# Patient Record
Sex: MALE | Race: WHITE
[De-identification: names, ages, dates, MRNs, and addresses within clinical notes are randomized per-mention and may not be internally consistent; named-entity substitution may affect disease eponyms.]

---

## 2019-03-09 ENCOUNTER — HOSPITAL ENCOUNTER (EMERGENCY)
Dept: HOSPITAL 7 - FB.ED | Age: 29
Discharge: HOME | End: 2019-03-09
Payer: COMMERCIAL

## 2019-03-09 VITALS — SYSTOLIC BLOOD PRESSURE: 182 MMHG | DIASTOLIC BLOOD PRESSURE: 95 MMHG

## 2019-03-09 DIAGNOSIS — S61.211A: Primary | ICD-10-CM
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
## 2019-03-09 DIAGNOSIS — Z23: ICD-10-CM

## 2019-03-09 PROCEDURE — 12001 RPR S/N/AX/GEN/TRNK 2.5CM/<: CPT

## 2019-03-09 PROCEDURE — 99283 EMERGENCY DEPT VISIT LOW MDM: CPT

## 2019-03-09 PROCEDURE — 96372 THER/PROPH/DIAG INJ SC/IM: CPT

## 2019-03-09 PROCEDURE — 90715 TDAP VACCINE 7 YRS/> IM: CPT

## 2019-03-09 PROCEDURE — 90471 IMMUNIZATION ADMIN: CPT

## 2019-03-09 NOTE — ER
DATE OF PROCEDURE:  03/09/2019

 

This 28-year-old male was working at [employer] when he was cutting a

belt with a sharp knife.  The knife slipped and he sustained a tangential 
laceration

to the tip of his left index finger.  The injury is on the radial side and

resulted in removal of 1 x 1.5 cm area of skin.  A small corner of the nail was

included in the injury, but the underlying bone and majority of the fatty tissue

remained uninjured.  The patient presents bringing in the piece of skin that was

cut off.  The remainder of the finger appears normal.  A digital block of 0.5%

Marcaine was administered and then cautery was used to control bleeding in the

wound.  The piece of skin brought in was debrided and then it was secured back

covering the open wound with interrupted 4-0 Prolene sutures.  This will act as

a dressing as the underlying wound heals.  Antibiotic ointment and a sterile

dressing were placed.  The procedure was well tolerated by the patient.  He was

carefully instructed to elevate the hand, to leave the dressing in place until

he comes in to see me in the outpatient clinic in two days.  Tetanus booster and

a shot of Rocephin were given as well.  If he has further difficulty over the

weekend, then he will call or come back to the emergency room for re-evaluation.

 

Job#: [id number]

DD: 03/09/2019 1242

DT: 03/09/2019 1301 VANDANA/EVANGELIST OCHOA

## 2019-03-09 NOTE — EDM.PDOC
ED HPI GENERAL MEDICAL PROBLEM





- General


Stated Complaint: CUT FINGER-WORK RELATED


Time Seen by Provider: 03/09/19 10:57


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


History Limitations: Reports: No Limitations





- History of Present Illness


INITIAL COMMENTS - FREE TEXT/NARRATIVE: 





28 y.o.w.m came with his supervisor to the ED after he cut in his left index 

finger while cutting a belt. TD (?). Pt's supervisor put the tissue, which he 

cut off, in a plastic bag on ICE. Pt has FROM of his fingers,  the CAP refill 

is less then 2 sec.  The wound is  active bleeding, moderately. No pulsatile 

bleed. No N/V/D or any other acute med. issues. /92 Pulse 98 RR 18 Pulse 

ox 98% on RA. Temp 37.1


Onset Date: 03/09/19


Onset Time: 09:00


Duration: Minutes:, Hour(s):, Constant


Location: Reports: Upper Extremity, Left (index finger)


Quality: Reports: Dull, Other (bleeding)


Improves with: Reports: None


  ** Left Finger-Index


Pain Score (Numeric/FACES): 3





- Related Data


 Allergies











Allergy/AdvReac Type Severity Reaction Status Date / Time


 


No Known Allergies Allergy   Verified 05/10/15 16:44











Home Meds: 


 Home Meds





Lithium Carbonate [Eskalith CR] 1,800 mg PO BEDTIME 05/10/15 [History]


Lithium Carbonate [Lithium Carbonate ER] 300 mg PO BEDTIME 05/10/15 [History]


Ziprasidone HCl 160 mg PO BEDTIME 05/10/15 [History]


clonazePAM [Clonazepam] 1 mg PO BEDTIME 05/10/15 [History]


hydrOXYzine HCl [Atarax] 25 mg PO Q8H 05/10/15 [History]











ED ROS GENERAL





- Review of Systems


Review Of Systems: See Below


Constitutional: Reports: No Symptoms


HEENT: Reports: No Symptoms


Respiratory: Reports: No Symptoms


Cardiovascular: Reports: No Symptoms


Endocrine: Reports: No Symptoms


GI/Abdominal: Reports: No Symptoms


: Reports: No Symptoms


Musculoskeletal: Reports: Hand Pain (left index finger)


Skin: Reports: Wound (left index finger)


Neurological: Reports: No Symptoms


Psychiatric: Reports: No Symptoms


Hematologic/Lymphatic: Reports: No Symptoms


Immunologic: Reports: No Symptoms





ED EXAM, SKIN/RASH


Exam: See Below


Exam Limited By: No Limitations


General Appearance: Alert, WD/WN, Mild Distress


Eye Exam: Bilateral Eye: Normal Inspection


Ears: Normal External Exam


Nose: Normal Inspection


Throat/Mouth: Normal Inspection, Normal Lips, Normal Voice, No Airway Compromise


Head: Atraumatic, Normocephalic


Neck: Normal Inspection, Supple, Non-Tender


Respiratory/Chest: No Respiratory Distress, Lungs Clear, Normal Breath Sounds, 

Chest Non-Tender


Cardiovascular: Normal Peripheral Pulses, Regular Rate, Rhythm, No Edema, No 

Gallop, No Murmur, No Rub


Peripheral Pulses: 1+: Brachial (L)


GI/Abdominal: Normal Bowel Sounds, Soft, Non-Tender, No Organomegaly, No Mass


 (Male) Exam: No Hernia


Rectal (Males) Exam: Deferred


Back Exam: Normal Inspection, Full Range of Motion


Extremities: Normal Range of Motion, Other (laceration left index finger tip)


Neurological: Alert, Oriented, CN II-XII Intact


Psychiatric: Normal Affect, Normal Mood


Skin: Warm, Dry, Other


Location, Skin: Other (lefyt index finger tip)


Lymphatic: No Adenopathy





Course





- Vital Signs


Text/Narrative:: 


28 y.o.w.m came with his supervisor to the ED after he cut in his left index 

finger while cutting a belt. TD (?). Pt's supervisor put the tissue, which he 

cut off, in a plastic bag on ICE. Pt has FROM of his fingers,  the CAP refill 

is less then 2 sec.  The wound is  active bleeding, moderately. No pulsatile 

bleed. No N/V/D or any other acute med. issues. /92 Pulse 98 RR 18 Pulse 

ox 98% on RA. Temp 37.1


PE: WNWD W M with a left active bleeding finger Laceration


11.05 am Consultation: [facility]: Will see the pt in the ED


Procedure: Please see Dr. Husam Alexander's note


Impression: Left distal finger tip injury


Tx: Wound care, TD, Rocephin. Pt refused pain meds


Reexam; Improved


Plan: D/C with instructions


Last Recorded V/S: 


 Last Vital Signs











Temp  37.0 C   03/09/19 11:00


 


Pulse  96   03/09/19 11:00


 


Resp  18   03/09/19 11:00


 


BP  182/95 H  03/09/19 11:00


 


Pulse Ox  98   03/09/19 11:00














- Orders/Labs/Meds


Orders: 


 Active Orders 24 hr











 Category Date Time Status


 


 Vaccines to be Administered [RC] PER UNIT ROUTINE Care  03/09/19 12:41 Active











Meds: 


Medications














Discontinued Medications














Generic Name Dose Route Start Last Admin





  Trade Name Erin  PRN Reason Stop Dose Admin


 


Ceftriaxone Sodium  1 gm  03/09/19 11:27  





  Rocephin  IM  03/09/19 11:28  





  ONETIME ONE   





     





     





     





     


 


Diphtheria/Tetanus/Acell Pertussis  0.5 ml  03/09/19 12:41  





  Adacel  IM  03/09/19 12:42  





  .ONCE ONE   





     





     





     





     














Departure





- Departure


Time of Disposition: 12:43


Disposition: Home, Self-Care 01


Condition: Good


Clinical Impression: 


Injury of tip of finger of left hand


Qualifiers:


 Encounter type: initial encounter Qualified Code(s): S69.92XA - Unspecified 

injury of left wrist, hand and finger(s), initial encounter








- Discharge Information


Instructions:  Constipation, Infant, Easy-to-Read, Laceration Care, Adult, Easy-

to-Read


Referrals: 


Piter Simpson MD [Primary Care Provider] - 


Forms:  ED Department Discharge, ED Return to Work/School Form


Additional Instructions: 


Please keep the wound dry and clean, please f/u with Dr. Alexander, Surgeon, 

this Monday, please f/u Dr. Alexander, Surgeon,  instructions. Please take 

Tylenol/Motrin for pain, please come back to the ED if your symptoms get worse 

acutely





- My Orders


Last 24 Hours: 


My Active Orders





03/09/19 12:41


Vaccines to be Administered [RC] PER UNIT ROUTINE 














- Assessment/Plan


Last 24 Hours: 


My Active Orders





03/09/19 12:41


Vaccines to be Administered [RC] PER UNIT ROUTINE